# Patient Record
Sex: MALE
[De-identification: names, ages, dates, MRNs, and addresses within clinical notes are randomized per-mention and may not be internally consistent; named-entity substitution may affect disease eponyms.]

---

## 2020-11-03 ENCOUNTER — HOSPITAL ENCOUNTER (EMERGENCY)
Dept: HOSPITAL 5 - ED | Age: 20
Discharge: HOME | End: 2020-11-03
Payer: SELF-PAY

## 2020-11-03 VITALS — DIASTOLIC BLOOD PRESSURE: 78 MMHG | SYSTOLIC BLOOD PRESSURE: 135 MMHG

## 2020-11-03 DIAGNOSIS — H11.31: Primary | ICD-10-CM

## 2020-11-03 DIAGNOSIS — Z20.828: ICD-10-CM

## 2020-11-03 PROCEDURE — 99281 EMR DPT VST MAYX REQ PHY/QHP: CPT

## 2020-11-03 NOTE — EMERGENCY DEPARTMENT REPORT
ED General Adult HPI





- General


Chief complaint: Eye Problems


Stated complaint: BLOOD SHOT IN RIGHT EYE/ANTWAN


Time Seen by Provider: 11/03/20 21:04


Source: patient


Mode of arrival: Ambulatory


Limitations: No Limitations





- History of Present Illness


Initial comments: 








Patient is a 20-year-old male who presents emergency room with complaints of 

loss of taste and smell two days ago. pt states also has subconjunctival 

hemorrhage to the right eye tonight after coughing. pt denies  n/v/d, no fever, 

no productive cough, no CP, no abd pain. PMHx epilepsy takes keppra and 

depakote, no missed doses. no allergies to meds 








- Related Data


                                    Allergies











Allergy/AdvReac Type Severity Reaction Status Date / Time


 


No Known Allergies Allergy   Unverified 11/03/20 20:55














ED Review of Systems


ROS: 


Stated complaint: BLOOD SHOT IN RIGHT EYE/ANTWAN


Other details as noted in HPI





Comment: All other systems reviewed and negative





ED Past Medical Hx





- Past Medical History


Previous Medical History?: Yes


Additional medical history: epilepsy





- Surgical History


Past Surgical History?: No





- Social History


Smoking Status: Never Smoker


Substance Use Type: None





ED Physical Exam





- General


Limitations: No Limitations


General appearance: alert, in no apparent distress





- Head


Head exam: Present: atraumatic, normocephalic





- Eye


Eye exam: Present: PERRL, EOMI, other (subconjunctival hemorrhage right eye).  

Absent: conjunctival injection, periorbital swelling, periorbital tenderness


Pupils: Present: normal accommodation





- ENT


ENT exam: Present: normal orophraynx, mucous membranes moist, TM's normal 

bilaterally, normal external ear exam





- Respiratory


Respiratory exam: Present: normal lung sounds bilaterally.  Absent: respiratory 

distress, wheezes, rales, rhonchi, stridor, chest wall tenderness, accessory 

muscle use, decreased breath sounds, prolonged expiratory





- Cardiovascular


Cardiovascular Exam: Present: regular rate, normal rhythm, normal heart sounds. 

Absent: systolic murmur, diastolic murmur, rubs, gallop





- Neurological Exam


Neurological exam: Present: alert, oriented X3





- Psychiatric


Psychiatric exam: Present: normal affect, normal mood





- Skin


Skin exam: Present: warm, dry, intact





ED Course


                                   Vital Signs











  11/03/20





  20:54


 


Temperature 98.3 F


 


Pulse Rate 80


 


Respiratory 17





Rate 


 


Blood Pressure 135/78


 


O2 Sat by Pulse 100





Oximetry 














ED Medical Decision Making





- Medical Decision Making





Patient is a 20-year-old male who presents emergency room with complaints of 

loss of taste and smell two days ago. pt states also has subconjunctival 

hemorrhage to the right eye tonight after coughing. pt denies  n/v/d, no fever, 

no productive cough, no CP, no abd pain. PMHx epilepsy takes keppra and 

depakote, no missed doses. no allergies to meds. vss.  Patient is afebrile, no 

tachycardia, no hypoxia. on exam: subconjunctival hemorrhage right eye, EOMI, 

PERRLA, no periorbital edema or ecchymosis.  Breath sounds are currently are 

bilaterally, no wheezing, no rales, no rhonchi, no respiratory distress, no 

accessory muscle use.  Patient is presenting with concerns for COVID-19, patient

does not meet hospital criteria for admission or for hospital COVID-19 testing. 

Patient has no clinical signs of bacterial pneumonia or bacterial bronchitis.  

Patient has no clinical signs of dehydration.  Discussed supportive care and sym

ptomatic treatment with patient.  Discussed COVID-19 with patient, discussed 

strict return precautions, discussed outpatient testing, discussed self 

quarantine.  Advised patient Please increase your fluid intake over the next 

several days.  May take Tylenol as needed for fever or body aches.  May take 

over-the-counter cold symptom relief medication such as Mucinex or TheraFlu. 

Follow-up with a primary care doctor for reexamination.  Return to emergency 

room immediately for any new or worsening symptoms including but not limited to 

difficulty breathing, shortness of breath, severe chest pain, unable to tolerate

by mouth intake, etc.  Please self quarantine for 10 days from the onset of your

symptoms.  Please do not go out in public.  If you are around others at home 

please wear a mask.  If you need to cough or sneeze please do so in a napkin and

immediately throw it away and immediately wash your hands.  Wash your hands 

frequently.  Wipe everything down.  Recommend for you to get COVID-19 testing, 

may have this done at primary care doctor, health department, [facility] 

testing center.


Critical care attestation.: 


If time is entered above; I have spent that time in minutes in the direct care 

of this critically ill patient, excluding procedure time.








ED Disposition


Clinical Impression: 


 Loss of taste, Loss of smell, Suspected COVID-19 virus infection





Subconjunctival hemorrhage


Qualifiers:


 Laterality: right Qualified Code(s): H11.31 - Conjunctival hemorrhage, right 

eye





Disposition: DC-01 TO HOME OR SELFCARE


Is pt being admited?: No


Does the pt Need Aspirin: No


Condition: Stable


Instructions:  COVID-19, Subconjunctival Hemorrhage (ED)


Additional Instructions: 


Please increase your fluid intake over the next several days.  May take Tylenol 

as needed for fever or body aches.  May take over-the-counter cold symptom 

relief medication such as Mucinex or TheraFlu. Follow-up with a primary care 

doctor for reexamination.  Return to emergency room immediately for any new or 

worsening symptoms including but not limited to difficulty breathing, shortness 

of breath, severe chest pain, unable to tolerate by mouth intake, etc.  Please 

self quarantine for 10 days from the onset of your symptoms.  Please do not go 

out in public.  If you are around others at home please wear a mask.  If you 

need to cough or sneeze please do so in a napkin and immediately throw it away 

and immediately wash your hands.  Wash your hands frequently.  Wipe everything 

down.  Recommend for you to get COVID-19 testing, may have this done at primary 

care doctor, health department, [facility] testing center.


Referrals: 


ISATU SHAFFER MD [Staff Physician] - 2-3 Days


[facility] [Provider Group] - 2-3 Days


[facility], [LAB/CONTRACT] - 2-3 Days


Time of Disposition: 21:12


Print Language: ENGLISH

## 2020-11-03 NOTE — EVENT NOTE
ED Screening Note


ED Screening Note: 





states lost taste and smell two days ago 


states that subconjunctival hemorrhage to the right eye tonight after coughing


no n/v/d


no fever 


no productive cough 


no CP 


no abd pain 


PMHx epilepsy takes keppra and depakote, no missed doses


no allergies to meds

## 2021-03-11 ENCOUNTER — HOSPITAL ENCOUNTER (EMERGENCY)
Dept: HOSPITAL 5 - ED | Age: 21
Discharge: HOME | End: 2021-03-11
Payer: SELF-PAY

## 2021-03-11 VITALS — SYSTOLIC BLOOD PRESSURE: 130 MMHG | DIASTOLIC BLOOD PRESSURE: 87 MMHG

## 2021-03-11 DIAGNOSIS — J06.9: Primary | ICD-10-CM

## 2021-03-11 DIAGNOSIS — R04.0: ICD-10-CM

## 2021-03-11 DIAGNOSIS — H10.33: ICD-10-CM

## 2021-03-11 DIAGNOSIS — Z79.899: ICD-10-CM

## 2021-03-11 PROCEDURE — 99282 EMERGENCY DEPT VISIT SF MDM: CPT

## 2021-03-11 NOTE — EMERGENCY DEPARTMENT REPORT
ED Eye Problem HPI





- General


Stated complaint: FACIAL SWELLING/NOSE BLEED/SPITTING UP BLOOD





- History of Present Illness


Initial comments: 





Patient is a 20-year-old -American male with no past medical history 

presents to the ED with complaint of acute onset persistent nasal and sinus 

congestion, bilateral eye pain with purulent discharge and redness for the last 

3 days.  Patient states that prior to arrival in the ED he sneezed hard and 

started having nosebleeds on the left nasal passage.  Patient states that no one

else at home is at similar symptoms.  Patient denies traumatic injury, fever, 

chills, nausea, vomiting, cough, sore throat, abdominal pain, diarrhea, di

zziness, headache or change in vision.


MD chief complaint: eye pain, eye redness (2), other (purulent discharge; 

nosebleed; nasal congestion)


-: Sudden, days(s) (3)


Onset Description: sudden


Location: both eyes


Place: home


If Injury: none


Eye Symptoms: redness, pain, itching, discharge, other (Nosebleed; nasal 

congestion)


Severity: moderate


Severity scale (0 -10): 4


If Pain, Quality: burning, aching


Consistency: constant


Context: recent uri


Associated Symptoms: headache, rhinorrhea, other (nosebleed).  denies: fever, 

shortness of breath


Treatments Prior to Arrival: none





- Related Data


Patient Tetanus UTD: Yes


                                  Previous Rx's











 Medication  Instructions  Recorded  Last Taken  Type


 


Cetirizine HCl [Zyrtec 10mg tab] 10 mg PO DAILY #30 tablet 03/11/21 Unknown Rx


 


Gentamicin 0.3% Ophth Soln 2 drops OP Q4H #1 bottle 03/11/21 Unknown Rx


 


Ibuprofen [Motrin] 600 mg PO Q8H PRN #24 tablet 03/11/21 Unknown Rx


 


methylPREDNISolone [Medrol 4MG 4 mg PO DAILY #21 tab.ds.pk 03/11/21 Unknown Rx





DOSEPAK (21 tabs)]    











                                    Allergies











Allergy/AdvReac Type Severity Reaction Status Date / Time


 


No Known Allergies Allergy   Unverified 11/03/20 20:55














ED Review of Systems


ROS: 


Stated complaint: FACIAL SWELLING/NOSE BLEED/SPITTING UP BLOOD


Other details as noted in HPI





Constitutional: malaise.  denies: chills, fever


Eyes: eye pain (Bilateral eye pain), eye discharge (Bilateral eyes with purulent

discharge).  denies: vision change


ENT: epistaxis (Left-sided), congestion, other (Nasal congestion).  denies: ear 

pain, throat pain


Respiratory: denies: cough, shortness of breath, wheezing


Cardiovascular: denies: chest pain, palpitations


Endocrine: no symptoms reported


Gastrointestinal: denies: abdominal pain, nausea, vomiting, diarrhea


Genitourinary: denies: urgency, dysuria


Musculoskeletal: denies: back pain, joint swelling, arthralgia


Skin: denies: rash, lesions


Neurological: denies: headache, weakness, paresthesias


Psychiatric: denies: anxiety, depression


Hematological/Lymphatic: denies: easy bleeding, easy bruising





ED Past Medical Hx





- Past Medical History


Additional medical history: epilepsy





- Social History


Smoking Status: Never Smoker


Substance Use Type: None





- Medications


Home Medications: 


                                Home Medications











 Medication  Instructions  Recorded  Confirmed  Last Taken  Type


 


Cetirizine HCl [Zyrtec 10mg tab] 10 mg PO DAILY #30 tablet 03/11/21  Unknown Rx


 


Gentamicin 0.3% Ophth Soln 2 drops OP Q4H #1 bottle 03/11/21  Unknown Rx


 


Ibuprofen [Motrin] 600 mg PO Q8H PRN #24 tablet 03/11/21  Unknown Rx


 


methylPREDNISolone [Medrol 4MG 4 mg PO DAILY #21 tab.ds.pk 03/11/21  Unknown Rx





DOSEPAK (21 tabs)]     














ED Physical Exam





- General


General appearance: alert, in no apparent distress





- Head


Head exam: Present: atraumatic, normocephalic, normal inspection





- Eye


Eye exam: Present: PERRL, EOMI, other (Erythematous bilateral conjunctival with 

purulent discharge and matting on physical exam)


Pupils: Present: normal accommodation





- ENT


ENT exam: Present: normal orophraynx, mucous membranes moist, TM's normal 

bilaterally, normal external ear exam, other (Grossly congested nasal passages; 

no active nosebleed)





- Neck


Neck exam: Present: normal inspection





- Respiratory


Respiratory exam: Present: normal lung sounds bilaterally.  Absent: respiratory 

distress, wheezes, rales, rhonchi, chest wall tenderness, accessory muscle use, 

decreased breath sounds, prolonged expiratory





- Cardiovascular


Cardiovascular Exam: Present: regular rate, normal rhythm, normal heart sounds. 

Absent: systolic murmur, diastolic murmur, rubs, gallop





- GI/Abdominal


GI/Abdominal exam: Present: soft, normal bowel sounds.  Absent: distended, 

tenderness, guarding, rebound, hyperactive bowel sounds, hypoactive bowel 

sounds, organomegaly





- Extremities Exam


Extremities exam: Present: normal inspection, full ROM, normal capillary refill





- Back Exam


Back exam: Present: normal inspection, full ROM.  Absent: tenderness, CVA 

tenderness (R), CVA tenderness (L), muscle spasm, paraspinal tenderness, 

vertebral tenderness





- Neurological Exam


Neurological exam: Present: alert, oriented X3, CN II-XII intact, normal gait, 

reflexes normal





- Psychiatric


Psychiatric exam: Present: normal affect, normal mood





- Skin


Skin exam: Present: warm, dry, intact, normal color.  Absent: rash





ED Course


                                   Vital Signs











  03/11/21





  23:33


 


Temperature 98.7 F


 


Pulse Rate 86


 


Respiratory 16





Rate 


 


Blood Pressure 130/87


 


O2 Sat by Pulse 96





Oximetry 














ED Medical Decision Making





- Medical Decision Making





This is a 20-year-old -American male with no past medical history 

presents to the ED with complaint of acute onset persistent nasal and sinus 

congestion, bilateral eye pain with purulent discharge and redness for the last 

3 days.  Patient states that prior to arrival in the ED he sneezed hard and 

started having nosebleeds on the left nasal passage.  Patient states that no one

else at home is at similar symptoms.  In the ED, patient is alert and oriented 

x3 and is not in distress.  Based on the history and physical exam findings, the

patient will discharge home on medications and advised to follow-up with his 

primary care physician in 7 to 10 days for reevaluation.  Patient was advised 

return to the ED immediately if symptoms get worse.





- Differential Diagnosis


Viral conjunctivitis; bacterial conjunctivitis, keratitis, eye injury, URI


Critical care attestation.: 


If time is entered above; I have spent that time in minutes in the direct care 

of this critically ill patient, excluding procedure time.








ED Disposition


Clinical Impression: 


 Acute upper respiratory infection, Left-sided nosebleed





Acute conjunctivitis of both eyes


Qualifiers:


 Acute conjunctivitis type: unspecified Qualified Code(s): H10.33 - Unspecified 

acute conjunctivitis, bilateral





Disposition: DC-01 TO HOME OR SELFCARE


Is pt being admited?: No


Does the pt Need Aspirin: No


Condition: Stable


Instructions:  Bacterial Conjunctivitis, Adult, Easy-to-Read, Upper Respiratory 

Infection, Adult, Easy-to-Read


Additional Instructions: 


Apply the eyedrops to the affected eyes as directed, take medications by mouth 

as advised and follow up with your Primary care Physician in 7-10 days for 

reevaluation. Return to the ED immediately if symptoms get worse.


Prescriptions: 


Gentamicin 0.3% Ophth Soln 2 drops OP Q4H #1 bottle


methylPREDNISolone [Medrol 4MG DOSEPAK (21 tabs)] 4 mg PO DAILY #21 tab.ds.pk


Ibuprofen [Motrin] 600 mg PO Q8H PRN #24 tablet


 PRN Reason: Pain


Cetirizine HCl [Zyrtec 10mg tab] 10 mg PO DAILY #30 tablet


Referrals: 


[facility] [Provider Group] - 7-10 days


Time of Disposition: 23:30


Print Language: ENGLISH

## 2021-08-19 ENCOUNTER — HOSPITAL ENCOUNTER (EMERGENCY)
Dept: HOSPITAL 5 - ED | Age: 21
Discharge: LEFT BEFORE DISCHARGE | End: 2021-08-19
Payer: SELF-PAY

## 2021-08-19 VITALS — DIASTOLIC BLOOD PRESSURE: 66 MMHG | SYSTOLIC BLOOD PRESSURE: 112 MMHG

## 2021-08-19 DIAGNOSIS — Z79.899: ICD-10-CM

## 2021-08-19 DIAGNOSIS — J36: Primary | ICD-10-CM

## 2021-08-19 DIAGNOSIS — Z86.69: ICD-10-CM

## 2021-08-19 LAB
ALBUMIN SERPL-MCNC: 4 G/DL (ref 3.9–5)
ALT SERPL-CCNC: 7 UNITS/L (ref 7–56)
BASOPHILS # (AUTO): 0.1 K/MM3 (ref 0–0.1)
BASOPHILS NFR BLD AUTO: 0.6 % (ref 0–1.8)
BUN SERPL-MCNC: 7 MG/DL (ref 9–20)
BUN/CREAT SERPL: 8 %
CALCIUM SERPL-MCNC: 9.6 MG/DL (ref 8.4–10.2)
EOSINOPHIL # BLD AUTO: 0 K/MM3 (ref 0–0.4)
EOSINOPHIL NFR BLD AUTO: 0.3 % (ref 0–4.3)
HCT VFR BLD CALC: 39.5 % (ref 35.5–45.6)
HEMOLYSIS INDEX: 3
HGB BLD-MCNC: 13.1 GM/DL (ref 11.8–15.2)
LYMPHOCYTES # BLD AUTO: 1.6 K/MM3 (ref 1.2–5.4)
LYMPHOCYTES NFR BLD AUTO: 10.2 % (ref 13.4–35)
MCHC RBC AUTO-ENTMCNC: 33 % (ref 32–34)
MCV RBC AUTO: 78 FL (ref 84–94)
MONOCYTES # (AUTO): 1.7 K/MM3 (ref 0–0.8)
MONOCYTES % (AUTO): 10.3 % (ref 0–7.3)
PLATELET # BLD: 294 K/MM3 (ref 140–440)
RBC # BLD AUTO: 5.06 M/MM3 (ref 3.65–5.03)

## 2021-08-19 PROCEDURE — 96365 THER/PROPH/DIAG IV INF INIT: CPT

## 2021-08-19 PROCEDURE — 85025 COMPLETE CBC W/AUTO DIFF WBC: CPT

## 2021-08-19 PROCEDURE — 96375 TX/PRO/DX INJ NEW DRUG ADDON: CPT

## 2021-08-19 PROCEDURE — 80053 COMPREHEN METABOLIC PANEL: CPT

## 2021-08-19 PROCEDURE — 99284 EMERGENCY DEPT VISIT MOD MDM: CPT

## 2021-08-19 PROCEDURE — 70491 CT SOFT TISSUE NECK W/DYE: CPT

## 2021-08-19 PROCEDURE — 36415 COLL VENOUS BLD VENIPUNCTURE: CPT

## 2021-08-19 NOTE — EMERGENCY DEPARTMENT REPORT
<AZAR MELLO III - Last Filed: 21 22:26>





ED ENT HPI





- General


Chief complaint: Sore Throat


Stated complaint: THROAT SWELLING


Time Seen by Provider: 21 16:36





- Related Data


                                  Previous Rx's











 Medication  Instructions  Recorded  Last Taken  Type


 


Cetirizine HCl [Zyrtec 10mg tab] 10 mg PO DAILY #30 tablet 21 Unknown Rx


 


Gentamicin 0.3% Ophth Soln 2 drops OP Q4H #1 bottle 21 Unknown Rx


 


Ibuprofen [Motrin] 600 mg PO Q8H PRN #24 tablet 21 Unknown Rx


 


Clindamycin [Clindamycin CAP] 300 mg PO Q6H #40 capsule 21 Unknown Rx


 


methylPREDNISolone [Medrol 4MG 4 mg PO DAILY #21 tab.ds.pk 21 Unknown Rx





DOSEPAK (21 tabs)]    











                                    Allergies











Allergy/AdvReac Type Severity Reaction Status Date / Time


 


No Known Allergies Allergy   Unverified 20 20:55














ED Dental HPI





- General


Chief complaint: Sore Throat


Stated complaint: THROAT SWELLING


Time Seen by Provider: 21 16:36





- Related Data


                                  Previous Rx's











 Medication  Instructions  Recorded  Last Taken  Type


 


Cetirizine HCl [Zyrtec 10mg tab] 10 mg PO DAILY #30 tablet 21 Unknown Rx


 


Gentamicin 0.3% Ophth Soln 2 drops OP Q4H #1 bottle 21 Unknown Rx


 


Ibuprofen [Motrin] 600 mg PO Q8H PRN #24 tablet 21 Unknown Rx


 


Clindamycin [Clindamycin CAP] 300 mg PO Q6H #40 capsule 21 Unknown Rx


 


methylPREDNISolone [Medrol 4MG 4 mg PO DAILY #21 tab.ds.pk 21 Unknown Rx





DOSEPAK (21 tabs)]    











                                    Allergies











Allergy/AdvReac Type Severity Reaction Status Date / Time


 


No Known Allergies Allergy   Unverified 20 20:55














ED Past Medical Hx





- Medications


Home Medications: 


                                Home Medications











 Medication  Instructions  Recorded  Confirmed  Last Taken  Type


 


Cetirizine HCl [Zyrtec 10mg tab] 10 mg PO DAILY #30 tablet 21  Unknown Rx


 


Gentamicin 0.3% Ophth Soln 2 drops OP Q4H #1 bottle 21  Unknown Rx


 


Ibuprofen [Motrin] 600 mg PO Q8H PRN #24 tablet 21  Unknown Rx


 


Clindamycin [Clindamycin CAP] 300 mg PO Q6H #40 capsule 21  Unknown Rx


 


methylPREDNISolone [Medrol 4MG 4 mg PO DAILY #21 tab.ds.pk 21  Unknown Rx





DOSEPAK (21 tabs)]     














ED Course





- Reevaluation(s)


Reevaluation #1: 


I reviewed the findings and management of this patient in real-time and I have 

personally seen and examined this patient and participated in the decision 

making for this patient with the midlevel.  Patient is a 20-year-old male that 

presents emergency room with sore throat.  Patient states his sore throat is 

severe.  Patient is not vaccinated against COVID-19.








I examined the patient.  Patient's lung sounds are clear to auscultation.  

Patient's abdominal exam is negative.  Patient CV exam shows normal S1-S2.  

Patient's erythema noted to the pharynx.





Patient had a CT scan done.  Patient CT scan shows a developing peritonsillar 

abscess with airway impingement.





I discussed with the hospitalist the results and the hospitalist does not want 

to admit the patient here because we do not have ENT support.





I discussed all the results with patient.  Discussed plan of care with patient. 

I informed the patient that he will need to be transferred to another facility 

with a ENT capabilities.  This facility does not have ENT capabilities..  The 

midlevel has discussed with multiple facilities in the [facility] and none

of them have available beds to accept the patient.  I discussed this with the 

patient and the patient states he does not want to be transferred outside the 

[facility].  Patient states that he would like to leave the hospital and 

go to [place].  I discussed the risk with patient.  Patient voiced understanding 

of the risk.  Patient signed AMA form.


21 22:01








ED Medical Decision Making





- Lab Data


Result diagrams: 


                                 21 17:25





                                 21 17:25





ED Disposition


Clinical Impression: 


 Peritonsillar abscess





Disposition: 07 LEFT AGAINST MEDICAL ADVICE


Condition: Stable


Instructions:  Peritonsillar Abscess


Additional Instructions: 


I recommend taking the medications as prescribed. It is important that you try 

to follow-up with a local facility like [facility] [place] [facility] 

has [facility] and will be available to see you in the ER especially if your symptoms 

are getting worse.


Prescriptions: 


Clindamycin [Clindamycin CAP] 300 mg PO Q6H #40 capsule


methylPREDNISolone [Medrol 4MG DOSEPAK (21 tabs)] 4 mg PO DAILY #21 tab.ds.pk


Referrals: 


CLAUDY ROMERO MD [Staff Physician] - 2-3 Days


(ENT specialist


)


Forms:  Work/School Release Form(ED), AMA Form





<MIKEPAM RAHULSolitario - Last Filed: 21 22:53>





ED ENT HPI





- General


Source: patient


Mode of arrival: Ambulatory


Limitations: No Limitations





- History of Present Illness


Initial comments: 





20-year-old male with a past medical history of seizures presents to the ER 

today with complaints of severe sore throat and difficulty swallowing.  Patient 

states that symptoms started mildly about 8 days ago but has since gotten worse.

 He states that he has having difficulty opening his mouth due to pain and is 

also having pain radiate up into his ear and his head.  He states that he feels 

like symptoms worse on his left side of his throat.  He reports no drooling.  He

denies any fever or chills.  He denies any runny nose, nasal congestion, cough 

or any additional symptoms at this time.  He denies any known ill contacts.  He 

denies any recent travel.


MD complaint: sore throat, difficulty swallowing


-: Gradual, days(s) (8)





ED Dental HPI





- General


Source: patient


Mode of arrival: Ambulatory


Limitations: No Limitations





ED Review of Systems


ROS: 


Stated complaint: THROAT SWELLING


Other details as noted in HPI





Comment: All other systems reviewed and negative


Constitutional: denies: chills, fever


ENT: throat pain


Respiratory: denies: cough, shortness of breath, SOB with exertion, SOB at rest,

wheezing


Cardiovascular: denies: chest pain, palpitations


Gastrointestinal: denies: abdominal pain, nausea, diarrhea, constipation, 

hematemesis, melena, hematochezia


Genitourinary: denies: urgency, dysuria, frequency, hematuria, discharge, 

testicular pain, testicular mass


Musculoskeletal: denies: back pain, joint swelling, arthralgia, myalgia


Skin: denies: rash, lesions


Neurological: denies: headache, weakness, numbness, paresthesias, confusion, 

abnormal gait, vertigo


Psychiatric: denies: anxiety, depression, auditory hallucinations, visual 

hallucinations, homicidal thoughts, suicidal thoughts


Hematological/Lymphatic: denies: easy bleeding, easy bruising, swollen glands





ED Past Medical Hx





- Past Medical History


Previous Medical History?: Yes


Hx Seizures: Yes


Additional medical history: epilepsy





- Surgical History


Past Surgical History?: No





- Social History


Smoking Status: Never Smoker


Substance Use Type: None





ED Physical Exam





- General


Limitations: No Limitations


General appearance: alert, in no apparent distress





- Head


Head exam: Present: atraumatic, normocephalic, normal inspection





- Eye


Eye exam: Present: normal appearance, PERRL, EOMI


Pupils: Present: normal accommodation





- ENT


ENT exam: Present: mucous membranes moist





- Expanded ENT Exam


  ** Expanded


Mouth exam: Present: trismus, muffled voice (Mild).  Absent: drooling


Throat exam: Positive: tonsillar erythema, tonsillomegaly, tonsillar exudate, L 

peritonsillar mass





- Neck


Neck exam: Present: normal inspection, full ROM, lymphadenopathy.  Absent: 

meningismus





- Respiratory


Respiratory exam: Present: normal lung sounds bilaterally.  Absent: respiratory 

distress, wheezes, rales, rhonchi





- Cardiovascular


Cardiovascular Exam: Present: regular rate, normal rhythm, normal heart sounds





- GI/Abdominal


GI/Abdominal exam: Present: soft.  Absent: distended, tenderness, guarding, 

rebound





- Neurological Exam


Neurological exam: Present: alert, oriented X3





- Psychiatric


Psychiatric exam: Present: normal affect, normal mood





- Skin


Skin exam: Present: intact





ED Course


                                   Vital Signs











  21





  15:43 21:01


 


Temperature 98.3 F 


 


Pulse Rate 98 H 66


 


Respiratory 16 16





Rate  


 


Blood Pressure 116/70 


 


Blood Pressure  112/66





[Right]  


 


O2 Sat by Pulse 100 100





Oximetry  














ED Medical Decision Making





- Lab Data


Result diagrams: 


                                 21 17:25





                                 21 17:25





- Radiology Data


Radiology results: report reviewed


Patient: CATHERINE ESCALANTE                                                          

      MR#: [id number]  


6          


: 2000                                                                

Acct:[id number]      


 


Age/Sex: 20 / M                                                                

ADM Date: 21     


 


Loc: ED       


Attending Dr:   


 


 


Ordering Physician: PAM MCKEON  


Date of Service: 21  


Procedure(s): CT neck w con  


Accession Number(s): [id number]  


 


cc: PAM MCKEON   


 


 


CT neck w con  


 


 INDICATION / CLINICAL INFORMATION:  


 20 years Male; left tonsillar swelling/sore throat/left ear pain..  


 


 TECHNIQUE:  


 Contiguous thin cut axial images obtained through the neck following IV 

contrast. Sagittal and 


coronal reconstructions performed by the technologist. All CT scans at this 

location are performed 


using CT dose reduction for ALARA by means of automated exposure control.   


 


 COMPARISON:  


 None available.  


 


 FINDINGS: There is an ill-defined a focus of decreased attenuation centered 

within the left 


palatine soft tissues measuring approximately 2.6 cm AP by 2.7 cm transverse by 

3.4 cm sagittally 


and greatest dimensions. This finding would be most concerning for developing 

left peritonsillar 


abscess. There is associated mass effect with mild narrowing of the airway at 

this level. There is 


component of lymphoid hypertrophy involving the palatine soft tissues.  


 


 MUCOSAL SPACE: The findings at result in effacement of the left vallecula and 

piriform sinuses 


given the extending edema. However, the epiglottis appears appropriate in size 

at. The remaining 


laryngeal soft tissues otherwise appear fairly symmetric.  


 


 LYMPH NODES: There are not multilevel at notable cervical lymph nodes, 

particularly along the 


jugular chains which are likely reactive given the above findings.  


 


 Salivary glands: The visualized parotid and submandibular glands appear to 

demonstrate fairly 


symmetric attenuation without calcification.  


 


 THYROID GLAND: Unremarkable.  


 


 PARANASAL SINUSES: There is mild mucosal thickening within the left maxillary 

sinus.  


 


 SPINE: There is mild reversal of the cervical lordosis at. The cervical spine 

is otherwise 


unremarkable.  


 


 VASCULAR STRUCTURES: Vascular structures are grossly normal in appearance.  


 


 IMPRESSION:  


 1. The findings are compatible with developing left peritonsillar abscess with 

associated mass 


effect and adjacent edema as detailed above.   


 


 Signer Name: Justin Adrian MD   


 Signed: 2021 6:53 PM  


 Workstation Name: [id number]   


 


 


Transcribed By: MR  


Dictated By: Justin Adrian MD  


Electronically Authenticated By: Justin Adrian MD    


Signed Date/Time: 21                                


 


 


 


DD/DT: 21                                                            

  


TD/TT:








- Medical Decision Making


All labs reviewed --CBC shows leukocytosis with a white count of 16 otherwise 

unremarkable.  CMP unremarkable.  CT soft tissue neck shows a ill-defined focus 

of decreased attenuation centered within the left palatine soft tissue measuring

 approximately 2.6 x 2.7 x 3.4 which is concerning for developing left 

peritonsillar abscess with associated mass-effect with mild narrowing of the 

airway and adjacent edema.





Patient currently resting comfortably in the chair receiving IV fluids and 

getting his meds.  He still has pain with swallowing, muffled voice and some tri

smus but otherwise he is tolerating his secretions, and there is no respiratory 

distress or stridor.





Case discussed with Dr Ibrahima Valle, as Dr Boston as already left for the day- 

COnsult to ENT placed. 





2030: Discussed case  and reviewed CT report with ENT at [place], Dr Mcleod, and 

since this is more of a developing abscess and not a true discrete drainable 

abscess, she states that there is nothing to drain at this time.  Recommend 

possibly admitting patient with continued IV antibiotics and IV steroids and 

reassess.





2130: Discussed case with Hospitalist Dr Lechuga and he does not feel comfortable

 admitting the patient given that we do not have ENT available and recommend 

trying other hospitals in the area with ENT for transfer.





Called placed majority of the other hospitals in the [facility], but 

unfortunately at this time they had no beds available and on diversion and 

therefore unable to accept the patient.





Dr Mello, recommended transfering patient out of state. Dr Mello and I, 

Discussed the situation with the the patient, and that we will likely have to 

transfer him out of state, since a lot of hospitals in the [facility] have

 no beds available or are on diversion.  Patient stated that he did not want to 

be transferred outside of the [facility].  Explained to patient again the 

reason for why we have to transfer him and the importance of ENT seeing him.  

But he again did not want to get transferred outside of the [facility].  

Explained to patient that he will have to sign out AGAINST MEDICAL ADVICE.  

Discussed risk with patient.  He expressed understanding of all instructions.





[Catherine Escalante] Has decided to leave our facility AGAINST MEDICAL ADVICE.  I 

have assessed the patient's ability to make informed decision and it is my 

opinion at this time that the patient has the medical decision capacity to 

comprehend information regarding current medical condition and appreciates the 

impact of the disease or condition and the consequences of various options for 

treatment including foregoing treatment.  The patient possesses the ability to 

evaluate all treatment options, compared to risk and benefits of each option, 

communicate choice in a consistent manner over time and is able to make rational

choices.  I have explained to the patient further testing, treatment and 

evaluation I would like to perform during the current emergency department visit

as well as any possible alternatives that could be accomplished in a timely 

manner.  I have outlined the possible risk of foregoing any or all of these 

interventions and the patient understands and acknowledges that the decision to 

leave may result in undesirable consequences such as death, permanent disability

and/or loss of current lifestyle.  Even though leaving AMA is not ideal, I have 

instructed the patient to follow any discharge instructions given take any 

medications prescribed and resume care as soon as possible with another 

provider.  Additionally, we clearly stated that the patient is welcome to return

at any time to continue at our facility.


Critical care attestation.: 


If time is entered above; I have spent that time in minutes in the direct care 

of this critically ill patient, excluding procedure time.








ED Disposition


Is pt being admited?: No


Does the pt Need Aspirin: No


Time of Disposition: 22:06

## 2021-08-19 NOTE — CAT SCAN REPORT
CT neck w con



INDICATION / CLINICAL INFORMATION:

20 years Male; left tonsillar swelling/sore throat/left ear pain..



TECHNIQUE:

Contiguous thin cut axial images obtained through the neck following IV contrast. Sagittal and corona
l reconstructions performed by the technologist. All CT scans at this location are performed using CT
 dose reduction for ALARA by means of automated exposure control. 



COMPARISON:

None available.



FINDINGS: There is an ill-defined a focus of decreased attenuation centered within the left palatine 
soft tissues measuring approximately 2.6 cm AP by 2.7 cm transverse by 3.4 cm sagittally and greatest
 dimensions. This finding would be most concerning for developing left peritonsillar abscess. There i
s associated mass effect with mild narrowing of the airway at this level. There is component of lymph
oid hypertrophy involving the palatine soft tissues.



MUCOSAL SPACE: The findings at result in effacement of the left vallecula and piriform sinuses given 
the extending edema. However, the epiglottis appears appropriate in size at. The remaining laryngeal 
soft tissues otherwise appear fairly symmetric.



LYMPH NODES: There are not multilevel at notable cervical lymph nodes, particularly along the jugular
 chains which are likely reactive given the above findings.



Salivary glands: The visualized parotid and submandibular glands appear to demonstrate fairly symmetr
ic attenuation without calcification.



THYROID GLAND: Unremarkable.



PARANASAL SINUSES: There is mild mucosal thickening within the left maxillary sinus.



SPINE: There is mild reversal of the cervical lordosis at. The cervical spine is otherwise unremarkab
le.



VASCULAR STRUCTURES: Vascular structures are grossly normal in appearance.



IMPRESSION:

1. The findings are compatible with developing left peritonsillar abscess with associated mass effect
 and adjacent edema as detailed above. 



Signer Name: Justin Adrian MD 

Signed: 8/19/2021 6:53 PM

Workstation Name: [id number]

## 2022-04-15 ENCOUNTER — HOSPITAL ENCOUNTER (EMERGENCY)
Dept: HOSPITAL 5 - ED | Age: 22
Discharge: HOME | End: 2022-04-15
Payer: COMMERCIAL

## 2022-04-15 VITALS — SYSTOLIC BLOOD PRESSURE: 127 MMHG | DIASTOLIC BLOOD PRESSURE: 82 MMHG

## 2022-04-15 DIAGNOSIS — Z79.899: ICD-10-CM

## 2022-04-15 DIAGNOSIS — G40.909: Primary | ICD-10-CM

## 2022-04-15 LAB
BUN SERPL-MCNC: 12 MG/DL (ref 9–20)
BUN/CREAT SERPL: 15 %
CALCIUM SERPL-MCNC: 8.7 MG/DL (ref 8.4–10.2)
HCT VFR BLD CALC: 39 % (ref 35.5–45.6)
HEMOLYSIS INDEX: 14
HGB BLD-MCNC: 12.5 GM/DL (ref 11.8–15.2)

## 2022-04-15 PROCEDURE — 83735 ASSAY OF MAGNESIUM: CPT

## 2022-04-15 PROCEDURE — 99284 EMERGENCY DEPT VISIT MOD MDM: CPT

## 2022-04-15 PROCEDURE — 85014 HEMATOCRIT: CPT

## 2022-04-15 PROCEDURE — 36415 COLL VENOUS BLD VENIPUNCTURE: CPT

## 2022-04-15 PROCEDURE — 85018 HEMOGLOBIN: CPT

## 2022-04-15 PROCEDURE — 93005 ELECTROCARDIOGRAM TRACING: CPT

## 2022-04-15 PROCEDURE — 82550 ASSAY OF CK (CPK): CPT

## 2022-04-15 PROCEDURE — 80048 BASIC METABOLIC PNL TOTAL CA: CPT

## 2022-04-15 PROCEDURE — 96374 THER/PROPH/DIAG INJ IV PUSH: CPT

## 2022-04-15 NOTE — EMERGENCY DEPARTMENT REPORT
ED Seizure HPI





- General


Chief Complaint: Seizure


Stated Complaint: SEIZURE


Time Seen by Provider: 04/15/22 06:08


Source: patient, EMS ( EMS documentation not available at time of chart 

dictation ), RN notes reviewed


Mode of arrival: Stretcher


Limitations: No Limitations





- History of Present Illness


Initial Comments: 





This patient is a pleasant and cooperative 21-year-old male to female, who 

currently takes Vimpat and Keppra intermittently, presenting to the ER today 

with a complaint of possible seizure.  Patient denies physical pain.  Patient 

denies drug use.  Patient denies cough and urinary symptoms.  Patient endorses 

intermittent compliance with medication therapy.  Patient believes that she had 

a seizure a few months ago or a few weeks ago but is not certain.  The patient 

is currently on her cell phone, and is is in no acute distress at this time


MD Complaint: possible seizure


-: Sudden


Description of Episode: loss of consciousness, tonic-clonic movement


-: second(s)


Witnessed:: Yes


Seizure History: known seizure disorder, history of non-compliance


Place: work


Possible Precipitating Event: other (Medication noncompliance)


Associated Symptoms: denies other symptoms





- Related Data


                                  Previous Rx's











 Medication  Instructions  Recorded  Last Taken  Type


 


Cetirizine HCl [Zyrtec 10mg tab] 10 mg PO DAILY #30 tablet 21 Unknown Rx


 


Gentamicin 0.3% Ophth Soln 2 drops OP Q4H #1 bottle 21 Unknown Rx


 


Ibuprofen [Motrin] 600 mg PO Q8H PRN #24 tablet 21 Unknown Rx


 


Clindamycin [Clindamycin CAP] 300 mg PO Q6H #40 capsule 21 Unknown Rx


 


methylPREDNISolone [Medrol 4MG 4 mg PO DAILY #21 tab.ds.pk 21 Unknown Rx





DOSEPAK (21 tabs)]    











                                    Allergies











Allergy/AdvReac Type Severity Reaction Status Date / Time


 


No Known Allergies Allergy   Unverified 20 20:55














ED Review of Systems


ROS: 


Stated complaint: SEIZURE


Other details as noted in HPI





Constitutional: denies: fever


Eyes: denies: eye discharge


ENT: denies: epistaxis


Respiratory: denies: cough


Cardiovascular: denies: chest pain


Gastrointestinal: denies: abdominal pain


Genitourinary: denies: dysuria


Musculoskeletal: denies: back pain, myalgia


Neurological: denies: weakness





ED Past Medical Hx





- Past Medical History


Hx Seizures: Yes


Additional medical history: epilepsy





- Social History


Smoking Status: Never Smoker


Substance Use Type: None





- Medications


Home Medications: 


                                Home Medications











 Medication  Instructions  Recorded  Confirmed  Last Taken  Type


 


Cetirizine HCl [Zyrtec 10mg tab] 10 mg PO DAILY #30 tablet 21  Unknown Rx


 


Gentamicin 0.3% Ophth Soln 2 drops OP Q4H #1 bottle 21  Unknown Rx


 


Ibuprofen [Motrin] 600 mg PO Q8H PRN #24 tablet 21  Unknown Rx


 


Clindamycin [Clindamycin CAP] 300 mg PO Q6H #40 capsule 21  Unknown Rx


 


methylPREDNISolone [Medrol 4MG 4 mg PO DAILY #21 tab.ds.pk 21  Unknown Rx





DOSEPAK (21 tabs)]     














ED Physical Exam





- General


Limitations: No Limitations


General appearance: alert, in no apparent distress





- Head


Head exam: Present: atraumatic, normocephalic





- Eye


Eye exam: Present: normal appearance, PERRL, EOMI.  Absent: nystagmus





- ENT


ENT exam: Present: normal exam, normal orophraynx, mucous membranes moist, 

normal external ear exam, other (Superficial tongue bites are noted)





- Neck


Neck exam: Present: normal inspection, full ROM.  Absent: tenderness, mening

ismus





- Respiratory


Respiratory exam: Present: normal lung sounds bilaterally.  Absent: respiratory 

distress, wheezes, rales, rhonchi, stridor, decreased breath sounds





- Cardiovascular


Cardiovascular Exam: Present: regular rate, normal rhythm, normal heart sounds. 

Absent: bradycardia, tachycardia, irregular rhythm, systolic murmur, diastolic 

murmur, rubs, gallop





- GI/Abdominal


GI/Abdominal exam: Present: soft.  Absent: distended, tenderness, guarding, 

rebound, rigid, pulsatile mass





- Rectal


Rectal exam: Present: deferred





- Extremities Exam


Extremities exam: Present: normal inspection, full ROM, other (2+ pulses noted 

in the bilateral upper and lower extremities.  There is no palpable cord.   neg

ative Homans sign.  Muscular compartments are soft.  The pelvis is stable.).  

Absent: pedal edema, calf tenderness





- Back Exam


Back exam: Present: normal inspection, full ROM.  Absent: tenderness, CVA 

tenderness (R), CVA tenderness (L), paraspinal tenderness, vertebral tenderness





- Neurological Exam


Neurological exam: Present: alert, oriented X3, normal gait, reflexes normal, 

other (No facial droop.  Tongue midline.  Extraocular movements intact 

bilaterally.  Facial sensation intact to light touch in V1, V2, V3 distribution 

bilaterally.  5 and a 5 strength in 4 extremities.  Sensation intact to light 

touch in 4 extremities.).  Absent: motor sensory deficit





- Psychiatric


Psychiatric exam: Present: normal affect, normal mood





- Skin


Skin exam: Present: warm, dry, intact, normal color.  Absent: rash





ED Course


                                   Vital Signs











  04/15/22 04/15/22 04/15/22





  05:04 07:42 07:46


 


Temperature 98.1 F  


 


Pulse Rate 130 H  62


 


Respiratory 18  17





Rate   


 


Blood Pressure 113/70  123/76


 


O2 Sat by Pulse 100 100 100





Oximetry   


 


O2 Sat by Pulse   





Oximetry [   





Digit-Finger]   














  04/15/22 04/15/22 04/15/22





  08:00 08:04 08:16


 


Temperature   


 


Pulse Rate 75  79


 


Respiratory 12  20





Rate   


 


Blood Pressure 122/83  122/83


 


O2 Sat by Pulse 100  100





Oximetry   


 


O2 Sat by Pulse  99 





Oximetry [   





Digit-Finger]   














  04/15/22





  08:30


 


Temperature 


 


Pulse Rate 63


 


Respiratory 20





Rate 


 


Blood Pressure 127/82


 


O2 Sat by Pulse 99





Oximetry 


 


O2 Sat by Pulse 





Oximetry [ 





Digit-Finger] 














- Pulse Oximetry Interpretation


  ** Digit-Finger


Initial Pulse Oximetry Readin


O2 Sat by Pulse Oximetry: 99


Actions Taken: none





ED Medical Decision Making





- Lab Data


Result diagrams: 


                                 04/15/22 07:10





                                 04/15/22 07:10








                                   Vital Signs











  04/15/22 04/15/22 04/15/22





  05:04 07:42 07:46


 


Temperature 98.1 F  


 


Pulse Rate 130 H  62


 


Respiratory 18  17





Rate   


 


Blood Pressure 113/70  123/76


 


O2 Sat by Pulse 100 100 100





Oximetry   











                                   Lab Results











  04/15/22 04/15/22 Range/Units





  07:10 07:10 


 


Hgb  12.5   (11.8-15.2)  gm/dl


 


Hct  39.0   (35.5-45.6)  %


 


Sodium   139  (137-145)  mmol/L


 


Potassium   3.9  (3.6-5.0)  mmol/L


 


Chloride   103.4  ()  mmol/L


 


Carbon Dioxide   27  (22-30)  mmol/L


 


Anion Gap   13  mmol/L


 


BUN   12  (9-20)  mg/dL


 


Creatinine   0.8  (0.8-1.3)  mg/dL


 


Estimated GFR   > 60  ml/min


 


BUN/Creatinine Ratio   15  %


 


Glucose   97  ()  mg/dL


 


Calcium   8.7  (8.4-10.2)  mg/dL


 


Magnesium   2.00  (1.7-2.3)  mg/dL


 


Total Creatine Kinase   180 H  ()  units/L














- EKG Data


-: EKG Interpreted by Me


EKG shows normal: sinus rhythm


Rate: normal





- EKG Data


When compared to previous EKG there are: previous EKG unavailable





04/15/22 08:00


The EKG is interpreted 06: 1 6





Sinus rhythm, 71 bpm.  Normal axis, normal P wave axis, normal intervals, 

unremarkable EKG, not a STEMI.  No evidence of arrhythmia noted.  No bundle-

branch blocks noted.  No evidence of Brugada syndrome, arrhythmia genic right 

ventricular dysplasia





- Medical Decision Making





Differential diagnosis, including not limited to: Seizure, noncompliance, 

electrolyte derangement





Assessment and plan: 21-year-old patient who identifies as female, with a past 

medical history of seizure disorder, currently taking Vimpat, 150 mg twice 

daily, Keppra, 500 mg twice daily, with an articulated endorsement of 

intermittent medication noncompliance, presenting to the ER today with a resolv

ed seizure.  Patient is clinically sober with a GCS of 15.  Her physical exam is

benign and unremarkable with the exception of mild tongue bites.  Laboratory 

studies nonactionable, and EKG is unremarkable.  Patient denies drug use, and 

denies irritative and obstructive urinary symptoms.  The importance of 

medication compliance is discussed with the patient.  The patient is also 

advised to not drive or operate motor vehicles for the next 6 months.  Patient 

observed in this ER for hours without recurrent seizure or convulsion.  Multiple

repeat assessments, the patient is on her cell phone, texting, on social media 

applications, and in no acute distress.  The patient articulates that she does n

ot require refill on her medications.  All questions answered.  Return 

precautions are reviewed.


Critical care attestation.: 


If time is entered above; I have spent that time in minutes in the direct care 

of this critically ill patient, excluding procedure time.








ED Disposition


Clinical Impression: 


 History of seizure





Disposition:  HOME / SELF CARE / HOMELESS


Is pt being admited?: No


Does the pt Need Aspirin: No


Condition: Stable


Instructions:  Seizure, Adult, Easy-to-Read


Additional Instructions: 


Please continue current outpatient medications.  Please make certain to remain 

compliant with Keppra and Vimpat as prescribed.  Noncompliance with his seizure 

medications may result in breakthrough seizures, which may lead to death, 

disability, paralysis, loss of quality of life.  Patient is not to drive or 

operate motor vehicles for the next 6 months, or until cleared to do so by their

primary care doctor or neurologist.  Recommend follow-up with your primary care 

doctor or neurologist in the next 5 to 7 days.  Avoid consumption of alcohol, 

tobacco, and smoke products.





Please return to the emergency room right away with new pain, worsened pain, 

migration of pain, projectile vomiting, change in mental status, confusion, 

inability tolerate liquid feeds, new, worsened or different symptoms not present

on the initial emergency room evaluation


Referrals: 


KAREN GONZALEZ MD [Staff Physician] - 3-5 Days


[facility] [Provider Group] - 3-5 Days


Forms:  Work/School Release Form(ED)

## 2022-04-18 NOTE — ELECTROCARDIOGRAPH REPORT
[facility]

                                       

Test Date:    2022-04-15               Test Time:    06:16:01

Pat Name:     GOLDIE ESCALANTE            Department:   

Patient ID:   [id number]          Room:          

Gender:       M                        Technician:   NURSE

:          2000               Requested By: KEY ORTIZ

Order Number: [id number]              Reading MD:   Kellie Urbano

                                 Measurements

Intervals                              Axis          

Rate:         71                       P:            41

MA:           160                      QRS:          63

QRSD:         78                       T:            39

QT:           358                                    

QTc:          391                                    

                           Interpretive Statements

Sinus rhythm

No previous ECG available for comparison

Electronically Signed On 2022 13:53:43 EDT by Kellie Urbano